# Patient Record
Sex: FEMALE | Race: BLACK OR AFRICAN AMERICAN | NOT HISPANIC OR LATINO | URBAN - NONMETROPOLITAN AREA
[De-identification: names, ages, dates, MRNs, and addresses within clinical notes are randomized per-mention and may not be internally consistent; named-entity substitution may affect disease eponyms.]

---

## 2019-03-13 ENCOUNTER — IMPORTED ENCOUNTER (OUTPATIENT)
Dept: URBAN - NONMETROPOLITAN AREA CLINIC 1 | Facility: CLINIC | Age: 79
End: 2019-03-13

## 2019-03-13 PROBLEM — H40.1132: Noted: 2019-03-13

## 2019-03-13 PROBLEM — Z91.14: Noted: 2019-03-13

## 2019-03-13 PROBLEM — E11.9: Noted: 2019-03-13

## 2019-03-13 PROBLEM — H25.13: Noted: 2019-03-13

## 2019-03-13 PROCEDURE — 99212 OFFICE O/P EST SF 10 MIN: CPT

## 2019-03-13 PROCEDURE — 92083 EXTENDED VISUAL FIELD XM: CPT

## 2019-03-13 NOTE — PATIENT DISCUSSION
POAG OU - Moderate Stage (Hx of noncompliance)- IOP's OU are inadequately controlled under current medical management.- Recommend continue medications with addition of Latanprost QHS OU.- Recommend follow-up in 3-4 weeks. - Recommend OCT of the RNFL upon return. NIDDM s DR HOWE-Stressed the importance of keeping blood sugars under control blood pressure under control and weight normalization and regular visits with PCP. -Explained the possible effects of poorly controlled diabetes and the damage that diabetes can cause to ocular health. -Patient to check HgbA1C.-Pt instructed to contact our office with any vision changes. Cataract OU-Not yet surgical. -Reviewed symptoms of advancing cataract growth such as glare and halos and decreased vision.-Continue to monitor for now. Pt will notify us if any new symptoms develop.

## 2019-04-26 ENCOUNTER — IMPORTED ENCOUNTER (OUTPATIENT)
Dept: URBAN - NONMETROPOLITAN AREA CLINIC 1 | Facility: CLINIC | Age: 79
End: 2019-04-26

## 2019-04-26 PROBLEM — H40.1132: Noted: 2019-04-26

## 2019-04-26 PROBLEM — E11.9: Noted: 2019-04-26

## 2019-04-26 PROBLEM — H50.112: Noted: 2019-04-26

## 2019-04-26 PROBLEM — H25.13: Noted: 2019-04-26

## 2019-04-26 PROBLEM — H53.032: Noted: 2019-04-26

## 2019-04-26 PROBLEM — Z91.14: Noted: 2019-04-26

## 2019-04-26 PROCEDURE — 99213 OFFICE O/P EST LOW 20 MIN: CPT

## 2019-04-26 NOTE — PATIENT DISCUSSION
Cataract OU-Visually significant cataract OU.-Cataract(s) causing symptomatic impairment of visual function not correctable with a tolerable change in glasses or contact lenses lighting or non-operative means resulting in specific activity limitations and/or participation restrictions including but not limited to reading viewing television driving or meeting vocational or recreational needs. -Expectation is clearer vision and functional improvement in symptoms as well as reduced glare disability after cataract removal.-Order IOLMaster and OPD today. -Recommend standard/traditional based on today's OPD testing and lifestyle questionnaire.-All questions were answered regarding surgery including pre and post-op medications appointments activity restrictions and anesthetic usage.-The risks benefits and alternatives and special risk factors for the patient were discussed in detail including but not limited to: bleeding infection retinal detachment vitreous loss problems with the implant and possible need for additional surgery.-Although rare the possibility of complete vision loss was discussed.-The possible need for glasses post-operatively was discussed.-Order medical clearance exam based on history of high cholesterol and hypertension and diabetes-Patient elects to proceed with cataract surgery OS. Will schedule at patient's convenience and re-evaluate OD in the future. ****Patient elects standard/traditional surgery OU starting with OS***Guarded Prognois due hx of Amblyopia and Exotropia **Recommend complex/trypan and Greishabers*Patient does NOT qualify for iStent based on VF results

## 2019-04-29 ENCOUNTER — IMPORTED ENCOUNTER (OUTPATIENT)
Dept: URBAN - NONMETROPOLITAN AREA CLINIC 1 | Facility: CLINIC | Age: 79
End: 2019-04-29

## 2019-04-29 PROBLEM — H25.13: Noted: 2019-04-29

## 2019-04-29 PROBLEM — E11.9: Noted: 2019-04-29

## 2019-04-29 PROBLEM — H40.1132: Noted: 2019-04-29

## 2019-04-29 PROBLEM — Z91.14: Noted: 2019-04-29

## 2019-05-01 ENCOUNTER — IMPORTED ENCOUNTER (OUTPATIENT)
Dept: URBAN - NONMETROPOLITAN AREA CLINIC 1 | Facility: CLINIC | Age: 79
End: 2019-05-01

## 2019-05-01 PROBLEM — Z01.818: Noted: 2019-05-01

## 2019-05-01 PROBLEM — E78.5: Noted: 2019-05-01

## 2019-05-01 PROBLEM — I10: Noted: 2019-05-01

## 2019-05-01 PROBLEM — Z91.14: Noted: 2019-05-01

## 2019-05-01 PROBLEM — E11.9: Noted: 2019-05-01

## 2019-05-28 ENCOUNTER — IMPORTED ENCOUNTER (OUTPATIENT)
Dept: URBAN - NONMETROPOLITAN AREA CLINIC 1 | Facility: CLINIC | Age: 79
End: 2019-05-28

## 2019-05-28 PROBLEM — Z91.14: Noted: 2019-05-28

## 2019-05-28 PROBLEM — E11.9: Noted: 2019-05-28

## 2019-05-28 PROBLEM — H40.1132: Noted: 2019-05-28

## 2019-05-28 PROBLEM — Z98.42: Noted: 2019-05-28

## 2019-05-28 PROBLEM — H25.811: Noted: 2019-05-28

## 2019-05-28 PROBLEM — H53.032: Noted: 2019-05-28

## 2019-05-28 PROCEDURE — 99024 POSTOP FOLLOW-UP VISIT: CPT

## 2019-05-28 PROCEDURE — 99212 OFFICE O/P EST SF 10 MIN: CPT

## 2019-05-28 NOTE — PATIENT DISCUSSION
Cataract OD-Visually significant cataract OD. -Cataract causing symptomatic impairment of visual function not correctable with a tolerable change in glasses or contact lenses lighting or non-operative means resulting in specific activity limitations and/or participation restrictions including but not limited to reading viewing television driving or meeting vocational or recreational needs. -Expectation is clearer vision and functional improvement in symptoms as well as reduced glare disability after cataract removal.-Recommend standard/traditional based on previous OPD testing and lifestyle questionnaire.-All questions were answered regarding surgery including pre and post-op medications appointments activity restrictions and anesthetic usage.-The risks benefits and alternatives and special risk factors for the patient were discussed in detail including but not limited to: bleeding infection retinal detachment vitreous loss problems with the implant and possible need for additional surgery.-Although rare the possibility of complete vision loss was discussed.-The need for glasses post-operatively was discussed.-Patient elects to proceed with cataract surgery OD. Will schedule at patient's convenience. 1 week s/p PCIOL OS-Pt doing well at 1 week s/p PCIOL. -Continue post-op gtts according to instruction sheet.-Okay to resume usual activites and d/c eye shield. POAG OU (moderate) - Recommend continue meds without change and continued care with Dr. Rik Klein. NIDDM s  OU-Stressed the importance of keeping blood sugars under control blood pressure under control and weight normalization and regular visits with PCP. -Explained the possible effects of poorly controlled diabetes and the damage that diabetes can cause to ocular health. -Patient to check HgbA1C.-Pt instructed to contact our office with any vision changes.

## 2019-06-26 ENCOUNTER — IMPORTED ENCOUNTER (OUTPATIENT)
Dept: URBAN - NONMETROPOLITAN AREA CLINIC 1 | Facility: CLINIC | Age: 79
End: 2019-06-26

## 2019-06-26 PROBLEM — I10: Noted: 2019-06-26

## 2019-06-26 PROBLEM — Z01.818: Noted: 2019-06-26

## 2019-06-26 PROBLEM — E11.9: Noted: 2019-06-26

## 2019-06-26 PROBLEM — E78.5: Noted: 2019-06-26

## 2019-07-18 ENCOUNTER — IMPORTED ENCOUNTER (OUTPATIENT)
Dept: URBAN - NONMETROPOLITAN AREA CLINIC 1 | Facility: CLINIC | Age: 79
End: 2019-07-18

## 2019-07-18 PROBLEM — Z98.41: Noted: 2019-07-18

## 2019-07-18 PROBLEM — E11.9: Noted: 2019-06-26

## 2019-07-18 PROCEDURE — 66982 XCAPSL CTRC RMVL CPLX WO ECP: CPT

## 2019-07-18 PROCEDURE — 92136 OPHTHALMIC BIOMETRY: CPT

## 2019-07-18 PROCEDURE — 99024 POSTOP FOLLOW-UP VISIT: CPT

## 2019-07-18 NOTE — PATIENT DISCUSSION
Same day s/p PCIOL OD-Pt doing well s/p PCIOL. -Continue post-op gtts according to instruction sheet and sleep with eye shield over eye for 7 nights.-Avoid bending at the waist lifting anything over 5lbs and dirty or jade environments.

## 2021-02-02 ENCOUNTER — IMPORTED ENCOUNTER (OUTPATIENT)
Dept: URBAN - NONMETROPOLITAN AREA CLINIC 1 | Facility: CLINIC | Age: 81
End: 2021-02-02

## 2021-02-02 PROBLEM — Z96.1: Noted: 2021-02-02

## 2021-02-02 PROBLEM — E11.9: Noted: 2021-02-02

## 2021-02-02 PROBLEM — H40.1132: Noted: 2021-02-02

## 2021-02-02 PROCEDURE — 99214 OFFICE O/P EST MOD 30 MIN: CPT

## 2021-02-02 PROCEDURE — 92250 FUNDUS PHOTOGRAPHY W/I&R: CPT

## 2021-02-02 NOTE — PATIENT DISCUSSION
NIDDM s DR-Stressed the importance of keeping blood sugars under control blood pressure under control and weight normalization and regular visits with PCP. -Explained the possible effects of poorly controlled diabetes and the damage that diabetes can cause to ocular health. -Patient to check HgbA1C.-Pt instructed to contact our office with any vision changes. 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 - Recommend follow-up in 3 months.- Recommend OCT of the RNFL upon return.

## 2021-05-17 NOTE — PATIENT DISCUSSION
PT WANTS TO PROCEED WITH RESTOR TORIC LENS IN THE DOMINANT EYE. DISCUSSED RISKS OF GLARE WITH MULTIFOCAL LENS AND CHANCE OF NEEDING READERS FOR VERY FINE PRINT OR IN DIM LIGHTING. PT IS OK WITH THIS.

## 2021-05-17 NOTE — PATIENT DISCUSSION
PATIENT THINKING ABOUT RESTOR VS PANOPTIX LENS. PATIENT UNDERSTANDS THE DIFFERENCES BETWEEN THE TWO LENS. WITH RESTOR WE CAN ADD READING POWER TO 2ND EYE. PANOPTIX DOES NOT HAVE THE OPTION TO INCREASE READING POWER.   WILL GO WITH RESTOR LENS WITH [address]

## 2021-05-17 NOTE — PATIENT DISCUSSION
Surgery Drop Counseling:  I have prescribed Ofloxacin, Ketorolac and DEXTENZA for use as directed before and after cataract surgery.

## 2021-07-27 ENCOUNTER — IMPORTED ENCOUNTER (OUTPATIENT)
Dept: URBAN - NONMETROPOLITAN AREA CLINIC 1 | Facility: CLINIC | Age: 81
End: 2021-07-27

## 2021-07-27 PROBLEM — H40.1132: Noted: 2021-07-27

## 2021-07-27 PROBLEM — E11.9: Noted: 2021-07-27

## 2021-07-27 PROBLEM — Z96.1: Noted: 2021-07-27

## 2021-07-27 PROCEDURE — 92133 CPTRZD OPH DX IMG PST SGM ON: CPT

## 2021-07-27 PROCEDURE — 99214 OFFICE O/P EST MOD 30 MIN: CPT

## 2021-07-27 NOTE — PATIENT DISCUSSION
POAG OU - Moderate StageDiscussed diagnosis in detail with patient. Discussed the chronic progressive nature of this disease and various treatment options. Importance of good compliance with medications was emphasized. OCT done today: inferior/nasal NFL thinning OD and inferior NFL thinning OS. IOP at 16 OD and 17 OS. Cup to disc noted at 0.85 OU. Continue Timolol QAM OU and Travatan QHS OU. Continue to monitor. RTC in 3 months with VF 24-2NIDDM s DRDiscussed diagnosis with patient. Discussed the risk of diabetic damage of the retina with potential vision loss and the importance of routine follow-up. Emphasized strict blood sugar control. Continue to monitor. P/C IOL OUDiscussed diagnosis in detail with patient. Doing well following CE with IOL implantation OU. Small anterior capsule openning that does not effect patients visual acuity. Recommend monitor for PCOContinue to monitor.; Dr's Notes: MRDFE   2/2/21PHOTOS   2/2/21OCT   7/27/21VF   3/13/19GONIO

## 2021-08-11 NOTE — PATIENT DISCUSSION
MODERATE DRY EYES: ****PATIENT WEARS A C PAP AT NIGHT****PRESCRIBED DISAPPEARING PRESERVATIVE OR PRESERVATIVE FREE ARTIFICIAL TEARS BID ? QID4-6X A DAY, OU AND THE DAILY INTAKE OF OMEGA-3 DHA/EPA FATTY ACIDS TO HELP RELIEVE SYMPTOMS. ADD NIGHTLY LUBRICATING OINTMENT OR GEL. WILL CONSIDER RESTASIS OR PUNCTAL PLUGS AND/OR LIPIFLOW TREATMENT NEXT VISIT IF NOT RESPONSIVE OR IF SYMPTOMS PERSIST. RETURN FOR FOLLOW-UP AS SCHEDULED OR SOONER IF SYMPTOMS WORSEN.

## 2021-12-07 ENCOUNTER — IMPORTED ENCOUNTER (OUTPATIENT)
Dept: URBAN - NONMETROPOLITAN AREA CLINIC 1 | Facility: CLINIC | Age: 81
End: 2021-12-07

## 2021-12-07 PROCEDURE — 92083 EXTENDED VISUAL FIELD XM: CPT

## 2021-12-07 PROCEDURE — 99214 OFFICE O/P EST MOD 30 MIN: CPT

## 2021-12-07 NOTE — PATIENT DISCUSSION
POAG OU - Moderate StageDiscussed diagnosis in detail with patient. Discussed the chronic progressive nature of this disease and various treatment options. Importance of good compliance with medications was emphasized. IOP at 14 OD and 14 OS. Cup to disc noted at 0.85 OU. Continue Timolol QAM OU and Travatan QHS OU. Continue to monitor. RTC in 3 months with Gonio. NIDDM s DRDiscussed diagnosis with patient. Discussed the risk of diabetic damage of the retina with potential vision loss and the importance of routine follow-up. Emphasized strict blood sugar control. Continue to monitor. P/C IOL OUDiscussed diagnosis in detail with patient. Doing well following CE with IOL implantation OU. Small anterior capsule openning that does not effect patients visual acuity. Recommend monitor for PCOContinue to monitor.; 's Notes: MRDFE   2/2/21PHOTOS   2/2/21OCT   7/27/21VF   12/07/21GONIO

## 2022-03-08 ENCOUNTER — FOLLOW UP (OUTPATIENT)
Dept: RURAL CLINIC 3 | Facility: CLINIC | Age: 82
End: 2022-03-08

## 2022-03-08 DIAGNOSIS — H40.1132: ICD-10-CM

## 2022-03-08 PROCEDURE — 92020 GONIOSCOPY: CPT

## 2022-03-08 PROCEDURE — 99214 OFFICE O/P EST MOD 30 MIN: CPT

## 2022-03-08 ASSESSMENT — VISUAL ACUITY
OS_SC: 20/40-2
OD_SC: 20/40-1

## 2022-03-08 ASSESSMENT — TONOMETRY
OD_IOP_MMHG: 16
OS_IOP_MMHG: 16

## 2022-03-08 NOTE — PATIENT DISCUSSION
Discussed diagnosis with patient. Discussed the risk of diabetic damage of the retina with potential vision loss and the importance of routine follow-up. Emphasized strict blood sugar control. Continue to monitor.

## 2022-04-09 ASSESSMENT — TONOMETRY
OS_IOP_MMHG: 19
OD_IOP_MMHG: 18
OS_IOP_MMHG: 24
OD_IOP_MMHG: 14
OS_IOP_MMHG: 14
OD_IOP_MMHG: 23
OS_IOP_MMHG: 14
OD_IOP_MMHG: 14
OD_IOP_MMHG: 16
OS_IOP_MMHG: 14
OS_IOP_MMHG: 10
OD_IOP_MMHG: 16
OD_IOP_MMHG: 18
OS_IOP_MMHG: 17

## 2022-04-09 ASSESSMENT — VISUAL ACUITY
OS_PH: 20/40
OD_CC: 20/25 -
OD_PAM: 20/25
OS_CC: 20/30-
OD_GLARE: 20/40
OD_PH: 20/30
OD_PAM: 20/25
OS_AM: 20/40
OD_CC: 20/40
OD_GLARE: 20/40
OS_CC: 20/40-1
OD_PH: 20/30
OD_GLARE: 20/70
OD_PH: 20/30
OD_PH: 20/30-2
OD_CC: 20/40
OS_CC: 20/50
OD_CC: 20/40
OS_PH: 20/100
OD_CC: 20/30
OD_PAM: 20/25
OS_CC: 20/40
OS_CC: 20/200
OS_CC: 20/350
OD_CC: 20/60
OD_CC: 20/40
OD_CC: 20/40-1
OD_PH: 20/30-1
OS_PH: 20/30
OS_CC: 20/30-2

## 2022-09-01 ENCOUNTER — FOLLOW UP (OUTPATIENT)
Dept: RURAL CLINIC 3 | Facility: CLINIC | Age: 82
End: 2022-09-01

## 2022-09-01 DIAGNOSIS — H40.1132: ICD-10-CM

## 2022-09-01 DIAGNOSIS — E11.9: ICD-10-CM

## 2022-09-01 DIAGNOSIS — H52.4: ICD-10-CM

## 2022-09-01 PROCEDURE — 92250 FUNDUS PHOTOGRAPHY W/I&R: CPT

## 2022-09-01 PROCEDURE — 92015 DETERMINE REFRACTIVE STATE: CPT

## 2022-09-01 PROCEDURE — 99214 OFFICE O/P EST MOD 30 MIN: CPT

## 2022-09-01 ASSESSMENT — TONOMETRY
OS_IOP_MMHG: 15
OD_IOP_MMHG: 15

## 2022-09-01 ASSESSMENT — VISUAL ACUITY
OS_PH: 20/40
OS_SC: 20/70-2
OD_SC: 20/30-2

## 2023-04-07 ENCOUNTER — FOLLOW UP (OUTPATIENT)
Dept: RURAL CLINIC 3 | Facility: CLINIC | Age: 83
End: 2023-04-07

## 2023-04-07 DIAGNOSIS — E11.9: ICD-10-CM

## 2023-04-07 DIAGNOSIS — H40.1131: ICD-10-CM

## 2023-04-07 PROCEDURE — 92083 EXTENDED VISUAL FIELD XM: CPT

## 2023-04-07 PROCEDURE — 92133 CPTRZD OPH DX IMG PST SGM ON: CPT

## 2023-04-07 PROCEDURE — 99214 OFFICE O/P EST MOD 30 MIN: CPT

## 2023-04-07 ASSESSMENT — VISUAL ACUITY
OS_SC: 20/40
OS_PH: 20/30
OD_PH: 20/25-2
OU_SC: 20/30-2
OD_SC: 20/40

## 2023-04-07 ASSESSMENT — TONOMETRY
OD_IOP_MMHG: 16
OS_IOP_MMHG: 16

## 2023-11-14 ENCOUNTER — FOLLOW UP (OUTPATIENT)
Dept: RURAL CLINIC 3 | Facility: CLINIC | Age: 83
End: 2023-11-14

## 2023-11-14 DIAGNOSIS — H52.4: ICD-10-CM

## 2023-11-14 DIAGNOSIS — H40.1131: ICD-10-CM

## 2023-11-14 DIAGNOSIS — E11.9: ICD-10-CM

## 2023-11-14 PROCEDURE — 99214 OFFICE O/P EST MOD 30 MIN: CPT

## 2023-11-14 PROCEDURE — 92015 DETERMINE REFRACTIVE STATE: CPT

## 2023-11-14 PROCEDURE — 92250 FUNDUS PHOTOGRAPHY W/I&R: CPT

## 2023-11-14 ASSESSMENT — VISUAL ACUITY
OD_SC: 20/30
OS_PH: 20/40
OS_SC: 20/50-2

## 2023-11-14 ASSESSMENT — TONOMETRY
OS_IOP_MMHG: 12
OD_IOP_MMHG: 12

## 2024-06-19 ENCOUNTER — FOLLOW UP (OUTPATIENT)
Dept: RURAL CLINIC 3 | Facility: CLINIC | Age: 84
End: 2024-06-19

## 2024-06-19 DIAGNOSIS — Z96.1: ICD-10-CM

## 2024-06-19 DIAGNOSIS — H40.1131: ICD-10-CM

## 2024-06-19 DIAGNOSIS — E11.9: ICD-10-CM

## 2024-06-19 PROCEDURE — 92083 EXTENDED VISUAL FIELD XM: CPT

## 2024-06-19 PROCEDURE — 92133 CPTRZD OPH DX IMG PST SGM ON: CPT

## 2024-06-19 PROCEDURE — 99213 OFFICE O/P EST LOW 20 MIN: CPT

## 2024-06-19 ASSESSMENT — VISUAL ACUITY
OS_SC: 20/50-2
OD_SC: 20/40
OS_PH: 20/40-2
OD_PH: 20/30

## 2024-06-19 ASSESSMENT — TONOMETRY
OD_IOP_MMHG: 14
OS_IOP_MMHG: 14

## 2025-06-18 ENCOUNTER — FOLLOW UP (OUTPATIENT)
Age: 85
End: 2025-06-18

## 2025-06-18 DIAGNOSIS — Z96.1: ICD-10-CM

## 2025-06-18 DIAGNOSIS — E11.9: ICD-10-CM

## 2025-06-18 DIAGNOSIS — H40.1131: ICD-10-CM

## 2025-06-18 PROCEDURE — 92250 FUNDUS PHOTOGRAPHY W/I&R: CPT

## 2025-06-18 PROCEDURE — 99214 OFFICE O/P EST MOD 30 MIN: CPT
